# Patient Record
Sex: MALE | HISPANIC OR LATINO | Employment: FULL TIME | URBAN - METROPOLITAN AREA
[De-identification: names, ages, dates, MRNs, and addresses within clinical notes are randomized per-mention and may not be internally consistent; named-entity substitution may affect disease eponyms.]

---

## 2019-03-12 ENCOUNTER — OFFICE VISIT (OUTPATIENT)
Dept: URGENT CARE | Facility: CLINIC | Age: 24
End: 2019-03-12
Payer: COMMERCIAL

## 2019-03-12 VITALS
HEIGHT: 70 IN | WEIGHT: 250 LBS | HEART RATE: 64 BPM | BODY MASS INDEX: 35.79 KG/M2 | DIASTOLIC BLOOD PRESSURE: 84 MMHG | OXYGEN SATURATION: 98 % | TEMPERATURE: 98.7 F | SYSTOLIC BLOOD PRESSURE: 132 MMHG

## 2019-03-12 DIAGNOSIS — L30.9 DERMATITIS: ICD-10-CM

## 2019-03-12 DIAGNOSIS — S60.821A: ICD-10-CM

## 2019-03-12 PROCEDURE — 99214 OFFICE O/P EST MOD 30 MIN: CPT | Performed by: NURSE PRACTITIONER

## 2019-03-12 RX ORDER — SULFAMETHOXAZOLE AND TRIMETHOPRIM 800; 160 MG/1; MG/1
1 TABLET ORAL 2 TIMES DAILY
Qty: 10 TAB | Refills: 0 | Status: SHIPPED | OUTPATIENT
Start: 2019-03-12 | End: 2019-03-19

## 2019-03-12 ASSESSMENT — ENCOUNTER SYMPTOMS
FEVER: 0
CHILLS: 0
NAUSEA: 0

## 2019-03-12 ASSESSMENT — LIFESTYLE VARIABLES: SUBSTANCE_ABUSE: 0
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
## 2020-11-18 ENCOUNTER — OFFICE VISIT (OUTPATIENT)
Dept: URGENT CARE | Facility: CLINIC | Age: 25
End: 2020-11-18

## 2020-11-18 VITALS
RESPIRATION RATE: 16 BRPM | SYSTOLIC BLOOD PRESSURE: 114 MMHG | BODY MASS INDEX: 35.28 KG/M2 | TEMPERATURE: 98.5 F | HEART RATE: 54 BPM | WEIGHT: 252 LBS | HEIGHT: 71 IN | DIASTOLIC BLOOD PRESSURE: 82 MMHG | OXYGEN SATURATION: 96 %

## 2020-11-18 DIAGNOSIS — H66.003 NON-RECURRENT ACUTE SUPPURATIVE OTITIS MEDIA OF BOTH EARS WITHOUT SPONTANEOUS RUPTURE OF TYMPANIC MEMBRANES: ICD-10-CM

## 2020-11-18 PROCEDURE — 99214 OFFICE O/P EST MOD 30 MIN: CPT | Performed by: PHYSICIAN ASSISTANT

## 2020-11-18 RX ORDER — OFLOXACIN 3 MG/ML
5 SOLUTION AURICULAR (OTIC) DAILY
Qty: 10 ML | Refills: 0 | Status: SHIPPED | OUTPATIENT
Start: 2020-11-18 | End: 2020-11-25

## 2020-11-18 RX ORDER — AMOXICILLIN AND CLAVULANATE POTASSIUM 875; 125 MG/1; MG/1
1 TABLET, FILM COATED ORAL 2 TIMES DAILY
Qty: 14 TAB | Refills: 0 | Status: SHIPPED | OUTPATIENT
Start: 2020-11-18 | End: 2020-11-25

## 2020-11-18 ASSESSMENT — ENCOUNTER SYMPTOMS
DIAPHORESIS: 0
COUGH: 0
SINUS PAIN: 0
VOMITING: 0
PALPITATIONS: 0
CHILLS: 0
SENSORY CHANGE: 0
NECK PAIN: 0
ABDOMINAL PAIN: 0
DIZZINESS: 0
HEADACHES: 0
SORE THROAT: 0
TINGLING: 0
MYALGIAS: 0
RHINORRHEA: 0
FEVER: 0
NAUSEA: 0

## 2020-11-18 NOTE — PROGRESS NOTES
Subjective:   Margarito Guadalupe is a 25 y.o. male who presents for Ear Fullness (x 2 wks, bilateral ear fullness)      Otalgia   There is pain in both ears. This is a new (Bilateral ear pain.  Sensation of fullness and pressure.  Tenderness to the canal.) problem. The current episode started 1 to 4 weeks ago (2 weeks). The problem occurs constantly. The problem has been unchanged. There has been no fever. The pain is moderate. Pertinent negatives include no abdominal pain, coughing, ear discharge, headaches, hearing loss, neck pain, rhinorrhea, sore throat or vomiting. He has tried ear drops for the symptoms. The treatment provided no relief. There is no history of a chronic ear infection.       Review of Systems   Constitutional: Negative for chills, diaphoresis, fever and malaise/fatigue.   HENT: Positive for ear pain. Negative for congestion, ear discharge, hearing loss, rhinorrhea, sinus pain, sore throat and tinnitus.    Respiratory: Negative for cough.    Cardiovascular: Negative for chest pain and palpitations.   Gastrointestinal: Negative for abdominal pain, nausea and vomiting.   Musculoskeletal: Negative for myalgias and neck pain.   Neurological: Negative for dizziness, tingling, sensory change and headaches.       Medications:    • amoxicillin-clavulanate Tabs  • IBUPROFEN  • mupirocin Oint  • ofloxacin otic sol Soln  • ofloxacin Soln  • silver sulfADIAZINE Crea  • triamcinolone acetonide Oint    Allergies: Other misc and Tape    Problem List: Margarito Guadalupe does not have a problem list on file.    Surgical History:  Past Surgical History:   Procedure Laterality Date   • TONSILLECTOMY AND ADENOIDECTOMY  3/4/2010    Performed by JANET UNDERWOOD at SURGERY SAME DAY [facility]   • OTHER  11/2009    abscess removed   • APPENDECTOMY LAPAROSCOPIC  10/16/2009    Performed by BRAD MEDINA at SURGERY [facility] ORS   • ORIF ELBOW      right elbow       Past Social Hx: Margarito  "Luis Alfredo Guadalupe  reports that he has never smoked. He has never used smokeless tobacco. He reports that he does not drink alcohol or use drugs.     Past Family Hx:  Margarito Guadalupe family history is not on file.     Problem list, medications, and allergies reviewed by myself today in Epic.     Objective:     /82 (BP Location: Left arm, Patient Position: Sitting, BP Cuff Size: Large adult)   Pulse (!) 54   Temp 36.9 °C (98.5 °F) (Temporal)   Resp 16   Ht 1.803 m (5' 11\")   Wt 114.3 kg (252 lb)   SpO2 96%   BMI 35.15 kg/m²     Physical Exam  Constitutional:       General: He is not in acute distress.     Appearance: Normal appearance. He is not ill-appearing, toxic-appearing or diaphoretic.   HENT:      Head: Normocephalic and atraumatic.      Right Ear: Ear canal and external ear normal. Tympanic membrane is erythematous and bulging.      Left Ear: External ear normal. Drainage and swelling present. Tympanic membrane is injected and erythematous.      Nose: Nose normal. No congestion or rhinorrhea.      Mouth/Throat:      Mouth: Mucous membranes are moist.      Pharynx: No oropharyngeal exudate or posterior oropharyngeal erythema.   Eyes:      Conjunctiva/sclera: Conjunctivae normal.   Neck:      Musculoskeletal: Normal range of motion. No neck rigidity or muscular tenderness.   Cardiovascular:      Rate and Rhythm: Normal rate and regular rhythm.      Pulses: Normal pulses.      Heart sounds: Normal heart sounds.   Pulmonary:      Effort: Pulmonary effort is normal.      Breath sounds: Normal breath sounds. No wheezing.   Abdominal:      Palpations: Abdomen is soft.      Tenderness: There is no abdominal tenderness.   Lymphadenopathy:      Cervical: No cervical adenopathy.   Skin:     General: Skin is warm and dry.      Capillary Refill: Capillary refill takes less than 2 seconds.   Neurological:      General: No focal deficit present.      Mental Status: He is alert. Mental status is at " baseline.   Psychiatric:         Mood and Affect: Mood normal.         Thought Content: Thought content normal.           Assessment/Plan:     Diagnosis and associated orders:     1. Non-recurrent acute suppurative otitis media of both ears without spontaneous rupture of tympanic membranes  amoxicillin-clavulanate (AUGMENTIN) 875-125 MG Tab    ofloxacin otic sol (FLOXIN OTIC) 0.3 % Solution      Comments/MDM:     Take antibiotic as directed.  Topical antibiotic drop provided shall he noticed no improvement in 2 to 3 days on oral antibiotic.  Physical exam not clear if there is a component of otitis externa to the left ear.  Oral hydration.  Ibuprofen or tylenol as directed for pain and/or fevers.   Follow up with primary care provider.    • Follow up for failure to improve after 48 to 72 hours of antibiotic therapy, worsening symptoms, persistent fevers, ear drainage, persistent or increased pain, lethargy, decreased urine output, complaint of headache or stiff neck, persistent vomiting or diarrhea, or any other concerns.           Differential diagnosis, natural history, supportive care, and indications for immediate follow-up discussed.    Advised the patient to follow-up with the primary care physician for recheck, reevaluation, and consideration of further management.    Please note that this dictation was created using voice recognition software. I have made reasonable attempt to correct obvious errors, but I expect that there are errors of grammar and possibly content that I did not discover before finalizing the note.    This note was electronically signed by RACHEL Stock PA-C